# Patient Record
(demographics unavailable — no encounter records)

---

## 2017-01-01 NOTE — ULTRASOUND REPORT
SCROTAL DUPLEX:  2017

 

CLINICAL INDICATION:  Right hydrocele or inguinal hernia. 

 

TECHNIQUE:  Real-time sonographic vascular imaging was performed by the sonographer through the scrot
um utilizing both color-flow and Doppler spectral analysis.  Multiple representative static images we
re saved for review.

 

FINDINGS:  The right testicle measures 1.5 x 0.9 x 0.8 cm, and the left testicle measures 1.2 x 0.9 x
 0.9 cm.  Both testicles demonstrate normal flow and echotexture.  Small hydroceles are present.  The
re is a right scrotal cyst, measuring 3.1 x 1.4 x 1.1 cm, likely epididymal in origin.  There is no e
vidence of right inguinal hernia. 

 

IMPRESSION:  LIKELY RIGHT EPIDIDYMAL CYST.  NO EVIDENCE OF RIGHT INGUINAL HERNIA.  NORMAL TESTES. 

 

 

 

DD:2017 13:16:52  DT: 2017 13:39  JOB #: [id number]  EXT JOB #:[id number]

## 2018-08-01 NOTE — ED PHYSICIAN DOCUMENTATION
PD HPI OPHTHO





- Stated complaint


Stated Complaint: LEFT EYE BUMP





- Chief complaint


Chief Complaint: Heent





- History obtained from


History obtained from: Family (mom)





- History of Present Illness


Timing - onset: Today (He has a known stye in the left eye, it opened up today 

and bled.  The patient does not seem bothered by it.)





Review of Systems


Constitutional: denies: Fever, Chills


Ears: denies: Ear pain


Nose: denies: Rhinorrhea / runny nose, Congestion





PD PAST MEDICAL HISTORY





- Present Medications


Home Medications: 


 Ambulatory Orders











 Medication  Instructions  Recorded  Confirmed


 


Erythromycin Base [Erythromycin] 1 applic OP 5XD 7 Days  oint...g. 08/01/18 














- Allergies


Allergies/Adverse Reactions: 


 Allergies











Allergy/AdvReac Type Severity Reaction Status Date / Time


 


No Known Drug Allergies Allergy   Verified 08/01/18 14:16














PD ED PE NORMAL





- Vitals


Vital signs reviewed: Yes





- General


General: No acute distress, Well developed/nourished





- HEENT


HEENT: PERRL, EOMI, Other (Left lower lid laterally there is a small ext stye 

with tiny blood clot on the top of it.  No evidence of active infection.)





- Psych


Psych: Normal mood, Normal affect





Results





- Vitals


Vitals: 





 Vital Signs - 24 hr











  08/01/18





  14:12


 


Temperature 37.4 C


 


Heart Rate 121


 


Respiratory 24





Rate 


 


O2 Saturation 98








 Oxygen











O2 Source                      Room air

















PD MEDICAL DECISION MAKING





- Sepsis Event


Vital Signs: 





 Vital Signs - 24 hr











  08/01/18





  14:12


 


Temperature 37.4 C


 


Heart Rate 121


 


Respiratory 24





Rate 


 


O2 Saturation 98








 Oxygen











O2 Source                      Room air

















Departure





- Departure


Disposition: 01 Home, Self Care


Clinical Impression: 


Hordeolum externum (stye)


Qualifiers:


 Laterality: left Eyelid: lower Qualified Code(s): H00.015 - Hordeolum externum 

left lower eyelid





Condition: Good


Record reviewed to determine appropriate education?: Yes


Instructions:  Stye Ch


Prescriptions: 


Erythromycin Base [Erythromycin] 1 applic OP 5XD 7 Days  oint...g.


Comments: 


Follow-up with your pediatrician if it does not resolve.